# Patient Record
Sex: FEMALE | Race: WHITE | Employment: OTHER | URBAN - METROPOLITAN AREA
[De-identification: names, ages, dates, MRNs, and addresses within clinical notes are randomized per-mention and may not be internally consistent; named-entity substitution may affect disease eponyms.]

---

## 2017-01-01 ENCOUNTER — HOSPITAL ENCOUNTER (OUTPATIENT)
Dept: OTHER | Age: 52
Discharge: HOME | End: 2017-01-04
Attending: INTERNAL MEDICINE | Admitting: INTERNAL MEDICINE

## 2017-01-09 ENCOUNTER — TELEPHONE (OUTPATIENT)
Dept: BARIATRICS/WEIGHT MGMT | Age: 52
End: 2017-01-09

## 2017-01-19 ENCOUNTER — TELEPHONE (OUTPATIENT)
Dept: BARIATRICS/WEIGHT MGMT | Age: 52
End: 2017-01-19

## 2017-06-23 ENCOUNTER — HOSPITAL ENCOUNTER (OUTPATIENT)
Dept: GENERAL RADIOLOGY | Age: 52
Discharge: HOME | End: 2017-06-23
Attending: INTERNAL MEDICINE | Admitting: INTERNAL MEDICINE

## 2017-06-28 ENCOUNTER — HOSPITAL ENCOUNTER (OUTPATIENT)
Dept: GENERAL RADIOLOGY | Age: 52
Discharge: HOME | End: 2017-06-28
Attending: INTERNAL MEDICINE | Admitting: INTERNAL MEDICINE

## 2017-06-28 LAB
ALBUMIN SERPL-MCNC: 3.7 GM/DL (ref 3.4–5)
ALP BLD-CCNC: 76 IU/L (ref 40–128)
ALT SERPL-CCNC: 15 U/L (ref 10–40)
ANION GAP SERPL CALCULATED.3IONS-SCNC: 14 MMOL/L (ref 4–16)
AST SERPL-CCNC: 16 IU/L (ref 15–37)
BASOPHILS ABSOLUTE: 0.1 K/CU MM
BASOPHILS RELATIVE PERCENT: 0.5 % (ref 0–1)
BILIRUB SERPL-MCNC: 0.2 MG/DL (ref 0–1)
BUN BLDV-MCNC: 15 MG/DL (ref 6–23)
CALCIUM SERPL-MCNC: 9.4 MG/DL (ref 8.3–10.6)
CHLORIDE BLD-SCNC: 100 MMOL/L (ref 99–110)
CO2: 27 MMOL/L (ref 21–32)
CREAT SERPL-MCNC: 1 MG/DL (ref 0.6–1.1)
DIFFERENTIAL TYPE: ABNORMAL
EOSINOPHILS ABSOLUTE: 0.2 K/CU MM
EOSINOPHILS RELATIVE PERCENT: 1.6 % (ref 0–3)
ESTIMATED AVERAGE GLUCOSE: 131 MG/DL
GFR AFRICAN AMERICAN: >60 ML/MIN/1.73M2
GFR NON-AFRICAN AMERICAN: 59 ML/MIN/1.73M2
GLUCOSE BLD-MCNC: 126 MG/DL (ref 70–140)
HBA1C MFR BLD: 6.2 % (ref 4.2–6.3)
HCT VFR BLD CALC: 41 % (ref 37–47)
HEMOGLOBIN: 13 GM/DL (ref 12.5–16)
IMMATURE NEUTROPHIL %: 0.3 % (ref 0–0.43)
LYMPHOCYTES ABSOLUTE: 1.9 K/CU MM
LYMPHOCYTES RELATIVE PERCENT: 21.1 % (ref 24–44)
MAGNESIUM: 2.4 MG/DL (ref 1.8–2.4)
MCH RBC QN AUTO: 28.8 PG (ref 27–31)
MCHC RBC AUTO-ENTMCNC: 31.7 % (ref 32–36)
MCV RBC AUTO: 90.7 FL (ref 78–100)
MONOCYTES ABSOLUTE: 0.4 K/CU MM
MONOCYTES RELATIVE PERCENT: 4.5 % (ref 0–4)
NUCLEATED RBC %: 0 %
PDW BLD-RTO: 12.8 % (ref 11.7–14.9)
PLATELET # BLD: 226 K/CU MM (ref 140–440)
PMV BLD AUTO: 12.2 FL (ref 7.5–11.1)
POTASSIUM SERPL-SCNC: 5.2 MMOL/L (ref 3.5–5.1)
RBC # BLD: 4.52 M/CU MM (ref 4.2–5.4)
SEGMENTED NEUTROPHILS ABSOLUTE COUNT: 6.6 K/CU MM
SEGMENTED NEUTROPHILS RELATIVE PERCENT: 72 % (ref 36–66)
SODIUM BLD-SCNC: 141 MMOL/L (ref 135–145)
TOTAL IMMATURE NEUTOROPHIL: 0.03 K/CU MM
TOTAL NUCLEATED RBC: 0 K/CU MM
TOTAL PROTEIN: 7.2 GM/DL (ref 6.4–8.2)
WBC # BLD: 9.2 K/CU MM (ref 4–10.5)

## 2017-12-28 ENCOUNTER — HOSPITAL ENCOUNTER (OUTPATIENT)
Dept: SPEECH THERAPY | Age: 52
Discharge: HOME | End: 2017-12-28
Attending: INTERNAL MEDICINE | Admitting: INTERNAL MEDICINE

## 2017-12-28 DIAGNOSIS — F72 SEVERE MENTAL RETARDATION: Primary | ICD-10-CM

## 2017-12-28 NOTE — PROGRESS NOTES
Speech Language Pathology    [facility]  Department of [facility] Pathology   [address], [facility]  [phone] ext [phone]  Fax: [phone]    Speech and Language Evaluation   Special populations    Name: Svitlana Melvin      : 1965  MRN: [id number]  Referring Physician: Dr J Carlos Zayas  Date of Evaluation: 2017  Referring Diagnosis and ICD 10: severe mental retardation F72  Case History:   Past Medical History:   Diagnosis Date    Anemia     Cataracts, bilateral     Cerebral palsy ([address]     Cerebral palsy ([address]     Constipation, chronic     CP (cerebral palsy) ([address]     Diabetes mellitus ([address]     Fibrocystic breast disease     GERD (gastroesophageal reflux disease)     pt is on pureed diet and thin liquids    Hemiplegia ([address]     Hemiplegia affecting left nondominant side ([address]     Hiatal hernia     History of blood transfusion     Hyperlipidemia     Liver dysfunction     Mental retardation, severe (I.Q. 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 Jackson Wolf communicated via  []movement toward/away   []gestures   [x]  facial expressions  [x]eye contact  [] vocalizations   [x]verbalizations    Jackson Wolf was not seen for previous speech and language treatment. Communication partners :staff and other residents  Communication environments:community, workshop and her residence  Residents hobbies and likes/dislikes:bingo, dancing, bowling and movies    ASSESSMENT  AUDIOLOGICAL  STATUS  Informal assessment revealed consistent responses to speech at:  [x]conversational volumes        ORAL MOTOR FUNCTION  Oral motor function was judged to be reduced overall. Labial: ROM:  [] intact  [x] reduced    Coordination: [] intact  [x] reduced       Strength: [] intact  [x] reduced             Lingual: ROM: [] intact  [x] reduced       Coordination: [] intact  [x] reduced        Strength:  [] intact  [x] reduced       Facial:  ROM   [] intact  [x] reduced        SPEECH INTELLIGIBILITY  Within connected speech, speech intelligibility was judged to be  [] good  [x] fair  [] poor for getting daily needs met. Comments: When speech is difficult to understand, staff members need to question her or anticipate her needs. RECEPTIVE LANGUAGE  Auditory  Responds to name via [x] head rotation  [x]eye contact   []waving  [x]facial  expression    [] vocalization  [x]verbalizations. [x] Anticipates familiar events     [x] Responds to simple yes/no questions regarding self and environment  [x] Responds  to WH-Questions   [x] Follows two step commands/directions (with minimal to moderate cues)  [x] Identifies objects: via [x] label   []function   in a field of 6  [x] Identifies pictures: via [x]label   []function   in a field of 6    Reading: Jackson Wolf does not read.     Comments:unable to match picture/object to word    EXPRESSIVE LANGUAGE  Communication was characterized by use

## 2018-12-31 ENCOUNTER — HOSPITAL ENCOUNTER (OUTPATIENT)
Dept: SPEECH THERAPY | Age: 53
Setting detail: THERAPIES SERIES
Discharge: HOME | End: 2018-12-31
Payer: MEDICARE

## 2018-12-31 DIAGNOSIS — F72 SEVERE MENTAL RETARDATION: Primary | ICD-10-CM

## 2018-12-31 PROCEDURE — G9162 LANG EXPRESS CURRENT STATUS: HCPCS

## 2018-12-31 PROCEDURE — 92523 SPEECH SOUND LANG COMPREHEN: CPT

## 2018-12-31 PROCEDURE — G9163 LANG EXPRESS GOAL STATUS: HCPCS

## 2018-12-31 PROCEDURE — G9164 LANG EXPRESS D/C STATUS: HCPCS

## 2019-11-26 ENCOUNTER — APPOINTMENT (OUTPATIENT)
Dept: GENERAL RADIOLOGY | Age: 54
End: 2019-11-26
Payer: MEDICARE

## 2019-11-26 ENCOUNTER — HOSPITAL ENCOUNTER (EMERGENCY)
Age: 54
Discharge: HOME | End: 2019-11-27
Attending: EMERGENCY MEDICINE
Payer: MEDICARE

## 2019-11-26 VITALS
SYSTOLIC BLOOD PRESSURE: 105 MMHG | TEMPERATURE: 98.3 F | WEIGHT: 182 LBS | RESPIRATION RATE: 18 BRPM | HEIGHT: 58 IN | HEART RATE: 63 BPM | OXYGEN SATURATION: 94 % | DIASTOLIC BLOOD PRESSURE: 50 MMHG | BODY MASS INDEX: 38.2 KG/M2

## 2019-11-26 DIAGNOSIS — R13.10 DYSPHAGIA, UNSPECIFIED TYPE: Primary | ICD-10-CM

## 2019-11-26 PROCEDURE — 70360 X-RAY EXAM OF NECK: CPT

## 2019-11-26 PROCEDURE — 99284 EMERGENCY DEPT VISIT MOD MDM: CPT

## 2019-11-26 PROCEDURE — 71046 X-RAY EXAM CHEST 2 VIEWS: CPT

## 2019-11-26 ASSESSMENT — ENCOUNTER SYMPTOMS
CHOKING: 1
SORE THROAT: 0
DIARRHEA: 0
ABDOMINAL PAIN: 0
VOMITING: 0
RHINORRHEA: 0
SINUS PRESSURE: 0
SHORTNESS OF BREATH: 0
CONSTIPATION: 0
COUGH: 0
WHEEZING: 0
NAUSEA: 0

## 2020-01-02 ENCOUNTER — HOSPITAL ENCOUNTER (OUTPATIENT)
Dept: SPEECH THERAPY | Age: 55
Setting detail: THERAPIES SERIES
Discharge: HOME | End: 2020-01-02
Payer: MEDICARE

## 2020-01-02 PROCEDURE — 92523 SPEECH SOUND LANG COMPREHEN: CPT

## 2020-01-02 NOTE — PROGRESS NOTES
and was accompanied by one of her caregivers  Noman Noble communicated via  [x]movement toward/away   []gestures   [x]  facial expressions  [x]eye contact  [] vocalizations   [x]verbalizations    Noman Noble was not seen for previous speech and language treatment. Communication environments:residence, workshop, community  Residents hobbies and likes/dislikes: watching game shows, going out to eat, playing bingo    ASSESSMENT  AUDIOLOGICAL  STATUS  Informal assessment revealed consistent responses to speech at:  [x]conversational volumes      ORAL MOTOR FUNCTION  Oral motor function was judged to be fair overall. Nica Piper is edentulous. Labial: ROM:  [] intact  [x] reduced       Coordination: [] intact  [x] reduced       Strength: [] intact  [x] reduced            Lingual: ROM: [] intact  [x] reduced       Coordination: [] intact  [x] reduced      Strength:  [] intact  [x] reduced     Facial:  ROM   [] intact  [x] reduced        SPEECH INTELLIGIBILITY  Within connected speech, speech intelligibility was judged to be fair overall  Comments: At times speech intelligibility was poor and repetition was requested by this therapist. Lasha Primes second attempts were usually understood by this therapist, although they contained errors and were reduced in intelligibility at times. RECEPTIVE LANGUAGE  Auditory  Responds to name via [x] head rotation  [x]eye contact   []waving  [x]facial  expression    [] vocalization  [x]verbalizations. [x] Anticipates familiar events     [x] Responds to simple yes/no questions regarding self and environment  [x] Responds  to WH-Questions- sometimes a delay before responding  [x] Follows simple one step commands/directions (with minimal cues). Two step commands were followed inconsistently  [x] Identifies objects: via [x] label   [x]function   in a field of 4  [] Identifies pictures: via [x]label   [x]function   in a field of 6    Reading: Noman Noble does not read.       EXPRESSIVE

## 2020-01-17 ENCOUNTER — HOSPITAL ENCOUNTER (OUTPATIENT)
Dept: SPEECH THERAPY | Age: 55
Setting detail: THERAPIES SERIES
Discharge: HOME | End: 2020-01-17
Payer: MEDICARE

## 2020-01-17 PROCEDURE — 92610 EVALUATE SWALLOWING FUNCTION: CPT

## 2020-01-17 NOTE — PROGRESS NOTES
elevation. Intermittent throat clear was observed, likely d/t reduced pharyngeal clearance. Clear vocal quality and 0 overt s/s of aspiration were observed with all PO trials given. Recommend continue puree diet/thin liquids by small cup/straw sips with strict aspiration precautions. Pt requires direct supervision with meals d/t impulsivity. Small bites and sips with meals. Recommend possible modified barium swallow study if pt demonstrates continued choking episodes or pulmonary status worsens. Date of Eval: 1/17/2020  Evaluating Therapist: Hayley Blanchard    Current Diet level:  Current Diet : Dysphagia Pureed (Dysphagia I)  Current Liquid Diet : Thin      Primary Complaint  Patient Complaint: n/a    Pain:       Reason for Referral  Mary Canseco was referred for a bedside swallow evaluation to assess the efficiency of her swallow function, identify signs and symptoms of aspiration and make recommendations regarding safe dietary consistencies, effective compensatory strategies, and safe eating environment. Impression  Dysphagia Diagnosis: Mild to moderate oral stage dysphagia;Mild to moderate pharyngeal stage dysphagia  Dysphagia Outcome Severity Scale: Level 4: Mild moderate dysphagia- Intermittent supervision/cueing. One - two diet consistencies restricted     Treatment Plan  Requires SLP Intervention: No  Duration/Frequency of Treatment: n/a  D/C Recommendations: 24 hour supervision/assistance       Recommended Diet and Intervention  Diet Solids Recommendation: Dysphagia Pureed (Dysphagia I)  Liquid Consistency Recommendation: Thin  Recommended Form of Meds: Meds in puree  Recommendations: Modified barium swallow study       Compensatory Swallowing Strategies  Compensatory Swallowing Strategies: Remain upright for 30-45 minutes after meals;Upright as possible for all oral intake;Eat/Feed slowly; Small bites/sips    Treatment/Goals       General  Chart Reviewed: Yes  Behavior/Cognition: Alert; Cooperative;Pleasant mood  Respiratory Status: Room air  O2 Device: None (Room air)  Communication Observation: Functional  Follows Directions: Simple  Dentition: Edentulous  Patient Positioning: Upright in chair  Baseline Vocal Quality: Normal  Volitional Cough: Strong  Volitional Swallow: Delayed  Prior Dysphagia History: choking on pureed trials   Consistencies Administered: Dysphagia Pureed (Dysphagia I); Thin - straw; Thin - cup; Thin - teaspoon           Vision/Hearing  Vision  Vision: Within Functional Limits  Hearing  Hearing: Within functional limits    Oral Motor Deficits  Oral/Motor  Oral Motor: Exceptions to [facility]  Labial Coordination: Reduced  Lingual Coordination: Reduced    Oral Phase Dysfunction  Oral Phase  Oral Phase: Exceptions  Oral Phase Dysfunction  Impaired Mastication: All  Decreased Anterior to Posterior Transit: All  Lingual/Palatal Residue: All     Indicators of Pharyngeal Phase Dysfunction   Pharyngeal Phase  Pharyngeal Phase: Exceptions  Indicators of Pharyngeal Phase Dysfunction  Delayed Swallow: All  Decreased Laryngeal Elevation: All  Throat Clearing - Immediate: Puree - teaspoon    Prognosis  Prognosis  Prognosis for safe diet advancement: fair  Barriers to reach goals: cognitive deficits;severity of dysphagia  Individuals consulted  Consulted and agree with results and recommendations: Patient; Other (add comment)    Education  Patient Education: recommendations/POC  Patient Education Response: Verbalizes understanding  Safety Devices in place: Yes  Type of devices:  All fall risk precautions in place       Therapy Time  SLP Individual Minutes  Time In: 0900  Time Out: 0930  Minutes: Yvrose Elkins 88, CCC-SLP, 1/17/2020

## 2020-03-09 ENCOUNTER — ANESTHESIA EVENT (OUTPATIENT)
Dept: ENDOSCOPY | Age: 55
End: 2020-03-09

## 2020-03-11 NOTE — ANESTHESIA PRE PROCEDURE
MD   nystatin (MYCOSTATIN) 620260 UNIT/GM powder Apply topically 4 times daily as needed    Yes Historical Provider, MD   omeprazole (PRILOSEC) 20 MG capsule Take 20 mg by mouth daily. Yes Historical Provider, MD   citalopram (CELEXA) 20 MG tablet Take 20 mg by mouth daily. Yes Historical Provider, MD   furosemide (LASIX) 20 MG tablet Take 20 mg by mouth daily. Yes Historical Provider, MD   alendronate (FOSAMAX) 70 MG tablet Take 70 mg by mouth every 7 days. Yes Historical Provider, MD   levetiracetam (KEPPRA) 500 MG tablet Take 750 mg by mouth 2 times daily. Yes Historical Provider, MD   calcium-vitamin D (OSCAL-500) 500-200 MG-UNIT per tablet Take 1 tablet by mouth daily    Yes Historical Provider, MD   lacosamide (VIMPAT) 50 MG TABS tablet Take 50 mg by mouth 2 times daily    Yes Historical Provider, MD   acetaminophen (TYLENOL) 325 MG tablet Take 650 mg by mouth every 4 hours as needed. Yes Historical Provider, MD   clonidine (CATAPRES) 0.1 MG tablet Take 0.1 mg by mouth 2 times daily. Yes Historical Provider, MD   docusate sodium (COLACE) 100 MG capsule Take 200 mg by mouth every evening. Yes Historical Provider, MD   ferrous sulfate 325 (65 FE) MG tablet Take 325 mg by mouth daily (with breakfast)    Yes Historical Provider, MD       Current medications:    No current facility-administered medications for this encounter.       Current Outpatient Medications   Medication Sig Dispense Refill    insulin glargine (BASAGLAR KWIKPEN) 100 UNIT/ML injection pen Inject 17 Units into the skin 2 times daily      B Complex-C-E-Zn (ADVANCED STRESS FORMULA/ZINC) TABS Take 1 tablet by mouth nightly      Icosapent Ethyl (VASCEPA) 1 g CAPS capsule Take 2 capsules by mouth 4 times daily      Cholecalciferol (VITAMIN D3) 50 MCG (2000 UT) CAPS Take 1,000 Units by mouth daily      Zinc Oxide (DESITIN CREAMY EX) Apply topically daily as needed      atorvastatin (LIPITOR) 10 MG tablet Take 10 mg by mouth [address]    Anesthesia Evaluation    Airway:         Dental:          Pulmonary:                              Cardiovascular:    (+) hypertension:, hyperlipidemia         Beta Blocker:  Not on Beta Blocker      ROS comment: Echo 1/2011:  IMPRESSIONS:    1.  Mild left ventricular hypertrophy noted. 2.  Left ventricular function preserved. Ejection fraction is around 50%  range. 3.  Mild left atrial enlargement present. 4.  Mild mitral and tricuspid regurgitation noted. 5.  Grade 2 diastolic dysfunction is noted. Neuro/Psych:   (+) seizures:, psychiatric history:             ROS comment: Mental retardation, severe (I.Q. 20-34) pt is verbal, non ambulatory, incontinent- pt feeds self but needs help with all other ADL's     Paraplegia    cerebral palsy GI/Hepatic/Renal:   (+) hiatal hernia, GERD:,           Endo/Other:    (+) DiabetesType II DM, , blood dyscrasia: thrombocytopenia:., .                 Abdominal:           Vascular: negative vascular ROS. Anesthesia Plan      MAC     ASA 4       Induction: intravenous. NADEGE Newton - CRNA   3/11/2020         Pre Anesthesia Assessment complete.  Chart reviewed on 3/11/2020

## 2020-03-12 ENCOUNTER — ANESTHESIA (OUTPATIENT)
Dept: ENDOSCOPY | Age: 55
End: 2020-03-12

## 2020-08-07 ENCOUNTER — HOSPITAL ENCOUNTER (OUTPATIENT)
Dept: LAB | Age: 55
Discharge: HOME | End: 2020-08-07
Payer: MEDICARE

## 2020-08-07 PROCEDURE — U0002 COVID-19 LAB TEST NON-CDC: HCPCS

## 2020-08-09 LAB
SARS-COV-2: NOT DETECTED
SOURCE: NORMAL

## 2020-08-12 ENCOUNTER — ANESTHESIA EVENT (OUTPATIENT)
Dept: ENDOSCOPY | Age: 55
End: 2020-08-12

## 2020-08-13 ENCOUNTER — ANESTHESIA (OUTPATIENT)
Dept: ENDOSCOPY | Age: 55
End: 2020-08-13

## 2020-08-13 ENCOUNTER — HOSPITAL ENCOUNTER (OUTPATIENT)
Age: 55
Setting detail: OUTPATIENT SURGERY
Discharge: HOME | End: 2020-08-13
Attending: INTERNAL MEDICINE | Admitting: INTERNAL MEDICINE
Payer: MEDICARE

## 2020-08-13 VITALS
DIASTOLIC BLOOD PRESSURE: 69 MMHG | OXYGEN SATURATION: 95 % | SYSTOLIC BLOOD PRESSURE: 149 MMHG | HEIGHT: 58 IN | RESPIRATION RATE: 16 BRPM | HEART RATE: 55 BPM | BODY MASS INDEX: 39.04 KG/M2 | WEIGHT: 186 LBS | TEMPERATURE: 96.5 F

## 2020-08-13 LAB — GLUCOSE BLD-MCNC: 142 MG/DL (ref 70–99)

## 2020-08-13 PROCEDURE — 82962 GLUCOSE BLOOD TEST: CPT

## 2020-08-13 RX ORDER — SODIUM CHLORIDE, SODIUM LACTATE, POTASSIUM CHLORIDE, CALCIUM CHLORIDE 600; 310; 30; 20 MG/100ML; MG/100ML; MG/100ML; MG/100ML
INJECTION, SOLUTION INTRAVENOUS CONTINUOUS
Status: DISCONTINUED | OUTPATIENT
Start: 2020-08-13 | End: 2020-08-13 | Stop reason: HOSPADM

## 2020-08-13 ASSESSMENT — PAIN - FUNCTIONAL ASSESSMENT: PAIN_FUNCTIONAL_ASSESSMENT: FACES

## 2020-08-13 NOTE — H&P
[facility] Pre-operative History and Physical    Patient: Chente Li  : 1965  Acct#:       HISTORY OF PRESENT ILLNESS:    The patient is a 49 y.o. female with significant past medical history as below who presents for EGD     Indication Dysphagia    History Obtained From:  Patient and review of all records    Past Medical History:        Diagnosis Date    Acute gastritis without bleeding     Anemia     Anemia     Cardiomegaly     Cataracts, bilateral     Cerebral palsy ([address]     Cerebral palsy ([address]     Cerebral palsy ([address]     Congenital hiatus hernia     Constipation, chronic     CP (cerebral palsy) ([address]     Cyst of pancreas     Diabetes mellitus ([address]     Essential (primary) hypertension     Fibrocystic breast disease     GERD (gastroesophageal reflux disease)     pt is on pureed diet and thin liquids    GERD (gastroesophageal reflux disease)     Hemiplegia (HCC)     Hemiplegia affecting left nondominant side ([address]     Hiatal hernia     History of blood transfusion     Hyperlipidemia     Hypocalcemia     Liver dysfunction     Major depressive disorder, single episode, unspecified     Mental retardation, severe (I.Q. 20-34)     pt is verbal, non ambulatory, incontinent- pt feeds self but needs help with all other ADL's    OA (osteoarthritis)     Other constipation     Other epilepsy, not intractable, without status epilepticus ([address]     Pancreatitis     Paraplegia ([address]     \"from waist down\"    Paraplegia ([address]     Paraplegia, unspecified ([address]     Pneumonia 2014    Pressure ulcer     sacral area    Seizure disorder ([address]     Seizures ([address]     per caregiver- no seizure since at [facility] 10/9/2015    Severe intellectual disabilities     Unspecified cataract        Past Surgical History:        Procedure Laterality Date    CHOLECYSTECTOMY      per old chart hx of Nguyễn choley by Dr Adebayo Plunkett  2017    ENDOSCOPY, COLON, DIAGNOSTIC      per old chart EGD 8/2013    EXTERNAL EAR SURGERY  8/2007    debridement of external auditory canals    HYSTERECTOMY      OTHER SURGICAL HISTORY  11 04 2015    closure of recurrent pilondial cyst         Current Medications:    Medications    Prior to Admission medications    Medication Sig Start Date End Date Taking? Authorizing Provider   insulin glargine (BASAGLAR KWIKPEN) 100 UNIT/ML injection pen Inject 17 Units into the skin 2 times daily    Historical Provider, MD GUSTAFSON Complex-C-E-Zn (ADVANCED STRESS FORMULA/ZINC) TABS Take 1 tablet by mouth nightly    Historical Provider, MD   Icosapent Ethyl (VASCEPA) 1 g CAPS capsule Take 2 capsules by mouth 4 times daily    Historical Provider, MD   Cholecalciferol (VITAMIN D3) 50 MCG (2000 UT) CAPS Take 1,000 Units by mouth daily    Historical Provider, MD   Zinc Oxide (DESITIN CREAMY EX) Apply topically daily as needed    Historical Provider, MD   atorvastatin (LIPITOR) 10 MG tablet Take 10 mg by mouth daily    Historical Provider, MD   Sodium Phosphates (FLEET) 7-19 GM/118ML Place 1 enema rectally once as needed    Historical Provider, MD   insulin aspart (NOVOLOG) 100 UNIT/ML injection vial Inject 16 Units into the skin 4 times daily Use sliding scale    Historical Provider, MD   magnesium hydroxide (MILK OF MAGNESIA) 400 MG/5ML suspension Take 30 mLs by mouth daily as needed for Constipation 10/8/15   Milli Daniel MD   magnesium oxide (MAG-OX) 400 (241.3 MG) MG TABS tablet Take 1 tablet by mouth 2 times daily. 11/21/14   Milli Daniel MD   potassium chloride SA (K-DUR;KLOR-CON M) 20 MEQ tablet Take 1 tablet by mouth daily (with breakfast). 11/21/14   Milli Daniel MD   bisacodyl (DULCOLAX) 10 MG suppository Place 10 mg rectally daily.     Historical Provider, MD   folic acid (FOLVITE) 742 MCG tablet Take 800 mcg by mouth daily     Historical Provider, MD   nystatin (MYCOSTATIN) 965904 UNIT/GM powder Apply topically 4 times daily as needed Historical Provider, MD   omeprazole (PRILOSEC) 20 MG capsule Take 20 mg by mouth daily. Historical Provider, MD   citalopram (CELEXA) 20 MG tablet Take 20 mg by mouth daily. Historical Provider, MD   furosemide (LASIX) 20 MG tablet Take 20 mg by mouth daily. Historical Provider, MD   alendronate (FOSAMAX) 70 MG tablet Take 70 mg by mouth every 7 days. Historical Provider, MD   levetiracetam (KEPPRA) 500 MG tablet Take 750 mg by mouth 2 times daily. Historical Provider, MD   calcium-vitamin D (OSCAL-500) 500-200 MG-UNIT per tablet Take 1 tablet by mouth daily     Historical Provider, MD   lacosamide (VIMPAT) 50 MG TABS tablet Take 50 mg by mouth 2 times daily     Historical Provider, MD   acetaminophen (TYLENOL) 325 MG tablet Take 650 mg by mouth every 4 hours as needed. Historical Provider, MD   clonidine (CATAPRES) 0.1 MG tablet Take 0.1 mg by mouth 2 times daily. Historical Provider, MD   docusate sodium (COLACE) 100 MG capsule Take 200 mg by mouth every evening. Historical Provider, MD   ferrous sulfate 325 (65 FE) MG tablet Take 325 mg by mouth daily (with breakfast)     Historical Provider, MD      Scheduled Medications:   Infusions:   PRN Medications: Allergies: Allergies   Allergen Reactions    Risperidone And Related Other (See Comments)     unknown    Tuberculin Tests Other (See Comments)         Allergies:  Risperidone and related and Tuberculin tests    Social History:   TOBACCO:   reports that she has never smoked. She has never used smokeless tobacco.  ETOH:   reports no history of alcohol use.     Family History:       Family history unknown: Yes       REVIEW OF SYSTEMS:    Negative except for HPI        PHYSICAL EXAM:      Vitals:    Ht 4' 10\" (1.473 m)   Wt 186 lb (84.4 kg)   BMI 38.87 kg/m²     General Appearance:    Alert, cooperative, no distress, appears stated age   [de-identified]:    NO anemia, No jaundice, No cyanosis   Neck:   Supple, symmetrical, trachea midline, no adenopathy;     thyroid:    Lungs:     Clear to auscultation bilaterally, respirations unlabored   Chest Wall:    No tenderness or deformity    Heart:    Regular rate and rhythm, S1 and S2 normal, no murmur, rub   or gallop   Abdomen:     Soft, non-tender, bowel sounds active all four quadrants,     no masses, no organomegaly, no ascitis   Rectal:    Not indicated   Extremities:   Extremities normal, atraumatic, no cyanosis or edema   Pulses:   2+ and symmetric all extremities   Skin:   Skin color, texture, turgor normal, no rashes or lesions   Lymph nodes:   No abnormality   Neurologic:   No focal deficits, moving all four extremities      ASA Grade:  ASA 3 - Patient with moderate systemic disease with functional limitations  Air way:    Prior Anesthesia complications: Nill      ASSESSMENT AND PLAN:    1. Patient is a 49 y.o. female here for EGD with deep sedation  2. Procedure options, risks and benefits reviewed with guardian. Guardian expresses understanding.       Sarah Martinez MD  [facility] gastroenterology  8/13/2020  8:06 AM

## 2021-01-25 ENCOUNTER — HOSPITAL ENCOUNTER (OUTPATIENT)
Dept: SPEECH THERAPY | Age: 56
Setting detail: THERAPIES SERIES
Discharge: HOME | End: 2021-01-25
Payer: MEDICARE

## 2021-01-25 DIAGNOSIS — F72 SEVERE INTELLECTUAL DISABILITIES: Primary | ICD-10-CM

## 2021-01-25 PROCEDURE — 92523 SPEECH SOUND LANG COMPREHEN: CPT

## 2021-01-25 NOTE — PROGRESS NOTES
Speech Language Pathology    [facility]  Department of [facility] Pathology   [facility], [address] [place] [address]  [phone] ext [phone]  Fax: [phone]    Speech and Language Evaluation   Special populations    Name: Song Beaver      : 1965  MRN: [id number]  Referring Physician: Dr Alton Jama Jr/   Date of Evaluation: 2021  Referring Diagnosis and ICD 10: severe intellectual disabilities F72  Case History:   Past Medical History:   Diagnosis Date    Acute gastritis without bleeding     Anemia     Anemia     Cardiomegaly     Cataracts, bilateral     Cerebral palsy ([address]     Cerebral palsy ([address]     Cerebral palsy ([address]     Congenital hiatus hernia     Constipation, chronic     CP (cerebral palsy) ([address]     Cyst of pancreas     Diabetes mellitus ([address]     Essential (primary) hypertension     Fibrocystic breast disease     GERD (gastroesophageal reflux disease)     pt is on pureed diet and thin liquids    GERD (gastroesophageal reflux disease)     Hemiplegia (HCC)     Hemiplegia affecting left nondominant side ([address]     Hiatal hernia     History of blood transfusion     Hyperlipidemia     Hypocalcemia     Liver dysfunction     Major depressive disorder, single episode, unspecified     Mental retardation, severe (I.Q. 20-34)     pt is verbal, non ambulatory, incontinent- pt feeds self but needs help with all other ADL's    OA (osteoarthritis)     Other constipation     Other epilepsy, not intractable, without status epilepticus ([address]     Pancreatitis     Paraplegia ([address]     \"from waist down\"    Paraplegia ([address]     Paraplegia, unspecified ([address]     Pneumonia 2014    Pressure ulcer     sacral area    Seizure disorder ([address]     Seizures ([address]     per caregiver- no seizure [facility]/9/2015    Severe intellectual disabilities     Unspecified cataract        Treatment Dx and ICD 10: communication disorder LANGUAGE  Auditory  Responds to name via [x] head rotation  [x]eye contact   []waving  [x]facial  expression    [] vocalization  [x]verbalizations. [x] Anticipates familiar events     [x] Responds to simple yes/no questions regarding self and environment  [x] Responds  to WH-Questions   [x] Follows two  step commands/directions (with minimal  cues)  [x] Identifies pictures: via [x]label   [x]function   in a field of 6    Reading: Dori Adams reportedly does not read. EXPRESSIVE LANGUAGE  Communication was characterized by use of:  [x]verbal utterances - during answering of questions and picture description, she produced mainly one word productions that varied in intelligibility   [x]gestures   [x]facial expressions    Likes/dislikes were expressed via one word responses      WRITTEN EXPRESSION  Non functional    [address] exhibited the following pragmatic skills to communicate the following requests:  Objects:[x] grasping   [x]verbalization   [x]eye contact   []leads staff by hand    [x]gesture/signs []facial expressions   []vocalizing    Assistance:   [x] grasping   [x]verbalization   [x]eye contact   []leads staff by hand    [x]gesture/signs []facial expressions   []vocalizing     Attention: [x] grasping   [x]verbalization   [x]eye contact   []leads staff by hand    [x]gesture/signs []facial expressions   []vocalizing    Cessation:[x] grasping   [x]verbalization   [x]eye contact   []leads staff by hand    [x]gesture/signs []facial expressions   []vocalizing    Affection: [x] grasping   [x]verbalization   [x]eye contact   []leads staff by hand    [x]gesture/signs []facial expressions   []vocalizing        IMPRESSIONS:  Dori Adams communication skills were judged to be functional  for the present communication environments in which communication regularly occurs. When her speech intelligibility is poor, staff will need to anticipate her needs.       Recommendations: no treatment is recommended at this time    Prognosis for effective communication within present communication environments is fair to good with assistance from staff. Pain rating 0/10:  Education:results of assessment were discussed with caregiver who verbalized understanding  Time In:1:24PM  Time Out:1:48PM  Total Evaluation Time:24 minutes      Rob Pradhan MS, CCC-SLP  Speech/Language Pathologist  1/25/2021  2:11 PM

## 2021-04-14 ENCOUNTER — OFFICE VISIT (OUTPATIENT)
Dept: BARIATRICS/WEIGHT MGMT | Age: 56
End: 2021-04-14
Payer: MEDICARE

## 2021-04-14 VITALS
WEIGHT: 186 LBS | SYSTOLIC BLOOD PRESSURE: 130 MMHG | DIASTOLIC BLOOD PRESSURE: 72 MMHG | OXYGEN SATURATION: 97 % | HEART RATE: 68 BPM | BODY MASS INDEX: 39.04 KG/M2 | HEIGHT: 58 IN

## 2021-04-14 DIAGNOSIS — L91.0 KELOID: Primary | ICD-10-CM

## 2021-04-14 PROCEDURE — 1036F TOBACCO NON-USER: CPT | Performed by: SURGERY

## 2021-04-14 PROCEDURE — G8427 DOCREV CUR MEDS BY ELIG CLIN: HCPCS | Performed by: SURGERY

## 2021-04-14 PROCEDURE — 3017F COLORECTAL CA SCREEN DOC REV: CPT | Performed by: SURGERY

## 2021-04-14 PROCEDURE — 99213 OFFICE O/P EST LOW 20 MIN: CPT | Performed by: SURGERY

## 2021-04-14 PROCEDURE — G8417 CALC BMI ABV UP PARAM F/U: HCPCS | Performed by: SURGERY

## 2021-04-14 ASSESSMENT — ENCOUNTER SYMPTOMS
PHOTOPHOBIA: 0
BLOOD IN STOOL: 0
DIARRHEA: 0
ANAL BLEEDING: 0
TROUBLE SWALLOWING: 0
ABDOMINAL PAIN: 0
VOMITING: 0
WHEEZING: 0
COUGH: 0
NAUSEA: 0
VOICE CHANGE: 0
SHORTNESS OF BREATH: 0
SORE THROAT: 0
CONSTIPATION: 0
COLOR CHANGE: 1
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
## 2021-04-26 NOTE — PROGRESS NOTES
4/26/21 - . LM concerning  surgery on 4/29/21 - have your covid-19 test performed within 10  days of your procedure, then quarantine yourself until after your procedure. Please call the PAT Nurse.

## 2021-04-26 NOTE — PROGRESS NOTES
4/26/21 - SUSAN with  @ [facility], have Nurse call PAT. Will need medication list, code status and most recent covid test faxed. Procedure is 2578, arrival 36. Will alert scheduling to not change time due to transportation.

## 2021-04-28 ENCOUNTER — TELEPHONE (OUTPATIENT)
Dept: BARIATRICS/WEIGHT MGMT | Age: 56
End: 2021-04-28

## 2021-04-28 NOTE — TELEPHONE ENCOUNTER
LEFT MESSAGE FOR David Clarke REGARDING SURGERY (EXC. OF 2 UPPERBACK AND 1 UPPER CHEST KELOIDS) SCHEDULED @ [facility].  NOTIFIED OF DATES, TIMES AND LOCATION    PHONE ASSESSMENT   COVID - DONE AT FACILITY, WILL BRING RESULTS  SURGERY - 5/20/21 @ 460  P/O - 5/28/21 @ 2594    NPO AFTER MIDNIGHT  HOLD BLOOD THINNERS - 5 DAYS PRIOR IF TAKING ANY    PER ALVERNE - REQUESTED ALL INFO BE LEFT ON VM

## 2021-05-17 NOTE — PROGRESS NOTES
5/17/21 -  at [address] [place] [address] for Nurse to call PAT - re: Nancy Landa scheduled 5/20/21 for surgery.

## 2021-05-18 NOTE — PROGRESS NOTES
Instructions reviewed with Siena Sánchez, GARIMA @ [facility]    Surgery 5/20/21 @ [phone], arrival 0600                1. Do not eat or drink anything after midnight - unless instructed by your doctor prior to surgery. This includes                   no water, chewing gum or mints. 2. Follow your directions as prescribed by the doctor for your procedure and medications. Give Clonidine, Keppra and Prilosec with a sip in am.   3. Check with your Doctor regarding stopping Plavix, Coumadin, Lovenox,Effient,Pradaxa,Xarelto, Fragmin or other blood thinners and                   follow their instructions. 4. Do not smoke, and do not drink any alcoholic beverages 24 hours prior to surgery. This includes NA Beer. 5. You may brush your teeth and gargle the morning of surgery. DO NOT SWALLOW WATER   6. You MUST make arrangements for a responsible adult to take you home after your surgery and be able to check on you every couple                   hours for the day. You will not be allowed to leave alone or drive yourself home. It is strongly suggested someone stay with you the first 24                   hrs. Your surgery will be cancelled if you do not have a ride home. 7. Please wear simple, loose fitting clothing to the hospital.  Mike Bergsaloni not bring valuables (money, credit cards, checkbooks, etc.) Do not wear any                   makeup (including no eye makeup) or nail polish on your fingers or toes. 8. DO NOT wear any jewelry or piercings on day of surgery. All body piercing jewelry must be removed. 9. If you have dentures, they will be removed before going to the OR; we will provide you a container. If you wear contact lenses or glasses,                  they will be removed; please bring a case for them. 10. If you  have a Living Will and Durable Power of  for Healthcare, please bring in a copy.            11. Please bring picture ID,  insurance card, paperwork from the doctors office    (H & P, Consent, & card for implantable devices). 12. Take a shower the night before or morning of your procedure, do not apply any lotion, oil or powder. 13. Wear a mask covering your nose & mouth when entering the hospital. Have your covid-19 test performed within 10 days of your                  surgery. Quarantine yourself after the test until after your surgery.

## 2021-05-19 ENCOUNTER — ANESTHESIA EVENT (OUTPATIENT)
Dept: OPERATING ROOM | Age: 56
End: 2021-05-19
Payer: MEDICARE

## 2021-05-19 NOTE — ANESTHESIA PRE PROCEDURE
Department of Anesthesiology  Preprocedure Note       Name:  David Clarke   Age:  48 y.o.  :  1965                                          MRN:  [id number]         Date:  2021      Surgeon: Rc Khan):  Trae Clancy MD    Procedure: Procedure(s):  TWO UPPER BACK AND ONE CHEST KELOID TORSO EXCISION    Medications prior to admission:   Prior to Admission medications    Medication Sig Start Date End Date Taking? Authorizing Provider   insulin glargine (BASAGLAR KWIKPEN) 100 UNIT/ML injection pen Inject 17 Units into the skin 2 times daily    Historical Provider, MD GUSTAFSON Complex-C-E-Zn (ADVANCED STRESS FORMULA/ZINC) TABS Take 1 tablet by mouth nightly    Historical Provider, MD   Icosapent Ethyl (VASCEPA) 1 g CAPS capsule Take 2 capsules by mouth 4 times daily    Historical Provider, MD   Cholecalciferol (VITAMIN D3) 50 MCG ( UT) CAPS Take 1,000 Units by mouth daily    Historical Provider, MD   Zinc Oxide (DESITIN CREAMY EX) Apply topically daily as needed    Historical Provider, MD   atorvastatin (LIPITOR) 10 MG tablet Take 10 mg by mouth daily    Historical Provider, MD   Sodium Phosphates (FLEET) 7-19 GM/118ML Place 1 enema rectally once as needed    Historical Provider, MD   insulin aspart (NOVOLOG) 100 UNIT/ML injection vial Inject 16 Units into the skin 4 times daily Use sliding scale    Historical Provider, MD   magnesium hydroxide (MILK OF MAGNESIA) 400 MG/5ML suspension Take 30 mLs by mouth daily as needed for Constipation 10/8/15   Wanda Johnson MD   magnesium oxide (MAG-OX) 400 (241.3 MG) MG TABS tablet Take 1 tablet by mouth 2 times daily. 14   Wanda Johnson MD   potassium chloride SA (K-DUR;KLOR-CON M) 20 MEQ tablet Take 1 tablet by mouth daily (with breakfast). 14   Wanda Johnson MD   bisacodyl (DULCOLAX) 10 MG suppository Place 10 mg rectally daily.     Historical Provider, MD   folic acid (FOLVITE) 410 MCG tablet Take 800 mcg by mouth daily     Historical Provider, MD nystatin (MYCOSTATIN) 908142 UNIT/GM powder Apply topically 4 times daily as needed     Historical Provider, MD   omeprazole (PRILOSEC) 20 MG capsule Take 20 mg by mouth daily. Historical Provider, MD   citalopram (CELEXA) 20 MG tablet Take 20 mg by mouth daily. Historical Provider, MD   furosemide (LASIX) 20 MG tablet Take 20 mg by mouth daily. Historical Provider, MD   alendronate (FOSAMAX) 70 MG tablet Take 70 mg by mouth every 7 days. Historical Provider, MD   levetiracetam (KEPPRA) 500 MG tablet Take 750 mg by mouth 2 times daily. Historical Provider, MD   calcium-vitamin D (OSCAL-500) 500-200 MG-UNIT per tablet Take 1 tablet by mouth daily     Historical Provider, MD   lacosamide (VIMPAT) 50 MG TABS tablet Take 50 mg by mouth 2 times daily     Historical Provider, MD   acetaminophen (TYLENOL) 325 MG tablet Take 650 mg by mouth every 4 hours as needed. Historical Provider, MD   clonidine (CATAPRES) 0.1 MG tablet Take 0.1 mg by mouth 2 times daily. Historical Provider, MD   docusate sodium (COLACE) 100 MG capsule Take 200 mg by mouth every evening. Historical Provider, MD   ferrous sulfate 325 (65 FE) MG tablet Take 325 mg by mouth daily (with breakfast)     Historical Provider, MD       Current medications:    No current facility-administered medications for this encounter.      Current Outpatient Medications   Medication Sig Dispense Refill    insulin glargine (BASAGLAR KWIKPEN) 100 UNIT/ML injection pen Inject 17 Units into the skin 2 times daily      B Complex-C-E-Zn (ADVANCED STRESS FORMULA/ZINC) TABS Take 1 tablet by mouth nightly      Icosapent Ethyl (VASCEPA) 1 g CAPS capsule Take 2 capsules by mouth 4 times daily      Cholecalciferol (VITAMIN D3) 50 MCG (2000 UT) CAPS Take 1,000 Units by mouth daily      Zinc Oxide (DESITIN CREAMY EX) Apply topically daily as needed      atorvastatin (LIPITOR) 10 MG tablet Take 10 mg by mouth daily      Sodium Phosphates (FLEET) 7-19 GM/118ML Place 1 enema rectally once as needed      insulin aspart (NOVOLOG) 100 UNIT/ML injection vial Inject 16 Units into the skin 4 times daily Use sliding scale      magnesium hydroxide (MILK OF MAGNESIA) 400 MG/5ML suspension Take 30 mLs by mouth daily as needed for Constipation      magnesium oxide (MAG-OX) 400 (241.3 MG) MG TABS tablet Take 1 tablet by mouth 2 times daily.  potassium chloride SA (K-DUR;KLOR-CON M) 20 MEQ tablet Take 1 tablet by mouth daily (with breakfast). 60 tablet 3    bisacodyl (DULCOLAX) 10 MG suppository Place 10 mg rectally daily.  folic acid (FOLVITE) 019 MCG tablet Take 800 mcg by mouth daily       nystatin (MYCOSTATIN) 865653 UNIT/GM powder Apply topically 4 times daily as needed       omeprazole (PRILOSEC) 20 MG capsule Take 20 mg by mouth daily.  citalopram (CELEXA) 20 MG tablet Take 20 mg by mouth daily.  furosemide (LASIX) 20 MG tablet Take 20 mg by mouth daily.  alendronate (FOSAMAX) 70 MG tablet Take 70 mg by mouth every 7 days.  levetiracetam (KEPPRA) 500 MG tablet Take 750 mg by mouth 2 times daily.  calcium-vitamin D (OSCAL-500) 500-200 MG-UNIT per tablet Take 1 tablet by mouth daily       lacosamide (VIMPAT) 50 MG TABS tablet Take 50 mg by mouth 2 times daily       acetaminophen (TYLENOL) 325 MG tablet Take 650 mg by mouth every 4 hours as needed.  clonidine (CATAPRES) 0.1 MG tablet Take 0.1 mg by mouth 2 times daily.  docusate sodium (COLACE) 100 MG capsule Take 200 mg by mouth every evening.  ferrous sulfate 325 (65 FE) MG tablet Take 325 mg by mouth daily (with breakfast)          Allergies:     Allergies   Allergen Reactions    Risperidone And Related Other (See Comments)     unknown    Tuberculin Tests Other (See Comments)       Problem List:    Patient Active Problem List   Diagnosis Code    Seizure disorder ([facility] G40.909    Infantile cerebral palsy ([facility] G80.9    Severe mental retardation F72    Encephalomalacia G93.89    Hypertension I10    Pressure ulcer, sacrum L89.159    Sepsis ([address] A41.9    Type II or unspecified type diabetes mellitus without mention of complication, uncontrolled [id number]    Hypokalemia E87.6    Thrombocytopenia, secondary D69.59    Hypocalcemia E83.51    Bleeding from wound T14. 8XXA    Wound of sacral region S31.000A    Decubitus ulcer of coccygeal region, stage 4 (HCC) L89.154    Decubitus ulcer L89.90    Open wound of lower back and pelvis w/o penentrat into retroperitoneum S31.000A    Keloid L91.0       Past Medical History:        Diagnosis Date    Acute gastritis without bleeding     Anemia     Anemia     Cardiomegaly     Cataracts, bilateral     Cerebral palsy (HCC)     Cerebral palsy (HCC)     Cerebral palsy (HCC)     Congenital hiatus hernia     Constipation, chronic     CP (cerebral palsy) (HCC)     Cyst of pancreas     Diabetes mellitus ([address]     Essential (primary) hypertension     Fibrocystic breast disease     GERD (gastroesophageal reflux disease)     pt is on pureed diet and thin liquids    GERD (gastroesophageal reflux disease)     Hemiplegia (HCC)     Hemiplegia affecting left nondominant side (HCC)     Hiatal hernia     History of blood transfusion 2011    Hyperlipidemia     Hypocalcemia     Liver dysfunction     Major depressive disorder, single episode, unspecified     Mental retardation, severe (I.Q. 20-34)     pt is verbal, non ambulatory, incontinent- pt feeds self but needs help with all other ADL's    OA (osteoarthritis)     Other constipation     Other epilepsy, not intractable, without status epilepticus ([address]     Pancreatitis 2011    Paraplegia ([address]     \"from waist down\"    Paraplegia ([address]     Paraplegia, unspecified ([address]     Pneumonia 11/2014    Pressure ulcer     sacral area    Seizure disorder ([address]     Seizures ([address]     per caregiver- no seizure [facility] at [facility] 10[facility]9/2015    Severe intellectual disabilities     Unspecified cataract        Past Surgical History:        Procedure Laterality Date    CHOLECYSTECTOMY      per old chart hx of Lap choley by Dr Jacquelin Hubbard  01/20/2017    ENDOSCOPY, COLON, DIAGNOSTIC      per old chart EGD 8/2013    EXTERNAL EAR SURGERY  8/2007    debridement of external auditory canals    HYSTERECTOMY      OTHER SURGICAL HISTORY  11 04 2015    closure of recurrent pilondial cyst       Social History:    Social History     Tobacco Use    Smoking status: Never Smoker    Smokeless tobacco: Never Used    Tobacco comment: caregiver is unsure of surgical, family or social hx1/19/2017   Substance Use Topics    Alcohol use: No                                Counseling given: Not Answered  Comment: caregiver is unsure of surgical, family or social hx1/19/2017      Vital Signs (Current):   Vitals:    05/18/21 1448   Weight: 177 lb 8 oz (80.5 kg)   Height: 4' 10\" (1.473 m)                                              BP Readings from Last 3 Encounters:   04/14/21 130/72   08/13/20 (!) 149/69   03/12/20 118/73       NPO Status:                                                                                 BMI:   Wt Readings from Last 3 Encounters:   04/14/21 186 lb (84.4 kg)   08/07/20 186 lb (84.4 kg)   03/10/20 177 lb 8 oz (80.5 kg)     Body mass index is 37.1 kg/m².     CBC:   Lab Results   Component Value Date    WBC 9.2 06/28/2017    RBC 4.52 06/28/2017    HGB 13.0 06/28/2017    HCT 41.0 06/28/2017    MCV 90.7 06/28/2017    RDW 12.8 06/28/2017     06/28/2017       CMP:   Lab Results   Component Value Date     06/28/2017    K 5.2 06/28/2017     06/28/2017    CO2 27 06/28/2017    BUN 15 06/28/2017    CREATININE 1.0 06/28/2017    GFRAA >60 06/28/2017    LABGLOM 59 06/28/2017    GLUCOSE 126 06/28/2017    PROT 7.2 06/28/2017    PROT 6.9 09/26/2012    CALCIUM 9.4 06/28/2017    BILITOT 0.2 06/28/2017    ALKPHOS 76 06/28/2017    AST 16 06/28/2017    ALT 15 06/28/2017       POC Tests: No results for input(s): POCGLU, POCNA, POCK, POCCL, POCBUN, POCHEMO, POCHCT in the last 72 hours. Coags:   Lab Results   Component Value Date    PROTIME 12.5 12/29/2011    INR 1.14 12/29/2011    APTT 27.4 12/29/2011       HCG (If Applicable): No results found for: PREGTESTUR, PREGSERUM, HCG, HCGQUANT     ABGs:   Lab Results   Component Value Date    PO2ART 78 06/11/2011    BEART 3.2 06/11/2011        Type & Screen (If Applicable):  No results found for: LABABO, LABRH    Drug/Infectious Status (If Applicable):  No results found for: HIV, HEPCAB    COVID-19 Screening (If Applicable):   Lab Results   Component Value Date    COVID19 NOT DETECTED 08/07/2020           Anesthesia Evaluation  Patient summary reviewed  Airway: Mallampati: Unable to assess / NA        Dental:    (+) edentulous      Pulmonary:   (+) pneumonia:            Patient did not smoke on day of surgery. Cardiovascular:  Exercise tolerance: poor (<4 METS),   (+) hypertension:,          Beta Blocker:  Not on Beta Blocker      ROS comment: Echo 2012:  IMPRESSIONS:    1.  Mild left ventricular hypertrophy noted. 2.  Left ventricular function preserved. Ejection fraction is around 50%  range. 3.  Mild left atrial enlargement present. 4.  Mild mitral and tricuspid regurgitation noted. 5.  Grade 2 diastolic dysfunction is noted. Neuro/Psych:   (+) seizures: poorly controlled, psychiatric history:depression/anxiety              ROS comment: Infantile cerebral palsy   Severe mental retardation    Paraplegia GI/Hepatic/Renal:   (+) hiatal hernia, GERD:, liver disease:, morbid obesity          Endo/Other:    (+) DiabetesType II DM, , .          Pt had no PAT visit       Abdominal:           Vascular: negative vascular ROS. Anesthesia Plan      general     ASA 4     (Covid -)  Induction: intravenous.     MIPS: Postoperative opioids intended and Prophylactic antiemetics administered. Anesthetic plan and risks discussed with legal guardian. Plan discussed with CRNA. NADEGE Guerrero - JASON   5/19/2021       Pre Anesthesia Assessment complete.  Chart reviewed on 5/19/2021

## 2021-05-20 ENCOUNTER — ANESTHESIA (OUTPATIENT)
Dept: OPERATING ROOM | Age: 56
End: 2021-05-20
Payer: MEDICARE

## 2021-05-20 ENCOUNTER — HOSPITAL ENCOUNTER (OUTPATIENT)
Age: 56
Setting detail: OUTPATIENT SURGERY
Discharge: HOME | End: 2021-05-20
Attending: SURGERY | Admitting: SURGERY
Payer: MEDICARE

## 2021-05-20 VITALS
HEART RATE: 79 BPM | WEIGHT: 177.5 LBS | SYSTOLIC BLOOD PRESSURE: 139 MMHG | DIASTOLIC BLOOD PRESSURE: 77 MMHG | HEIGHT: 58 IN | OXYGEN SATURATION: 99 % | RESPIRATION RATE: 16 BRPM | BODY MASS INDEX: 37.26 KG/M2 | TEMPERATURE: 98 F

## 2021-05-20 VITALS
SYSTOLIC BLOOD PRESSURE: 102 MMHG | DIASTOLIC BLOOD PRESSURE: 86 MMHG | TEMPERATURE: 97.7 F | RESPIRATION RATE: 13 BRPM | OXYGEN SATURATION: 96 %

## 2021-05-20 DIAGNOSIS — L91.0 KELOID: Primary | ICD-10-CM

## 2021-05-20 LAB
EKG ATRIAL RATE: 59 BPM
EKG DIAGNOSIS: NORMAL
EKG P AXIS: 9 DEGREES
EKG P-R INTERVAL: 168 MS
EKG Q-T INTERVAL: 428 MS
EKG QRS DURATION: 92 MS
EKG QTC CALCULATION (BAZETT): 423 MS
EKG R AXIS: 20 DEGREES
EKG T AXIS: 69 DEGREES
EKG VENTRICULAR RATE: 59 BPM
GLUCOSE BLD-MCNC: 126 MG/DL (ref 70–99)
HCT VFR BLD CALC: 39.8 % (ref 37–47)
HEMOGLOBIN: 13.1 GM/DL (ref 12.5–16)
MCH RBC QN AUTO: 28.6 PG (ref 27–31)
MCHC RBC AUTO-ENTMCNC: 32.9 % (ref 32–36)
MCV RBC AUTO: 86.9 FL (ref 78–100)
PDW BLD-RTO: 13 % (ref 11.7–14.9)
PLATELET # BLD: 192 K/CU MM (ref 140–440)
PMV BLD AUTO: 11.8 FL (ref 7.5–11.1)
RBC # BLD: 4.58 M/CU MM (ref 4.2–5.4)
REASON FOR REJECTION: NORMAL
REJECTED TEST: NORMAL
SARS-COV-2, NAAT: NOT DETECTED
SOURCE: NORMAL
WBC # BLD: 7.5 K/CU MM (ref 4–10.5)

## 2021-05-20 PROCEDURE — 2500000003 HC RX 250 WO HCPCS: Performed by: SURGERY

## 2021-05-20 PROCEDURE — 7100000000 HC PACU RECOVERY - FIRST 15 MIN: Performed by: SURGERY

## 2021-05-20 PROCEDURE — 7100000001 HC PACU RECOVERY - ADDTL 15 MIN: Performed by: SURGERY

## 2021-05-20 PROCEDURE — 3600000002 HC SURGERY LEVEL 2 BASE: Performed by: SURGERY

## 2021-05-20 PROCEDURE — 88305 TISSUE EXAM BY PATHOLOGIST: CPT | Performed by: PATHOLOGY

## 2021-05-20 PROCEDURE — 13101 CMPLX RPR TRUNK 2.6-7.5 CM: CPT | Performed by: SURGERY

## 2021-05-20 PROCEDURE — 6360000002 HC RX W HCPCS: Performed by: SURGERY

## 2021-05-20 PROCEDURE — 7100000010 HC PHASE II RECOVERY - FIRST 15 MIN: Performed by: SURGERY

## 2021-05-20 PROCEDURE — 11406 EXC TR-EXT B9+MARG >4.0 CM: CPT | Performed by: SURGERY

## 2021-05-20 PROCEDURE — 93005 ELECTROCARDIOGRAM TRACING: CPT | Performed by: ANESTHESIOLOGY

## 2021-05-20 PROCEDURE — 3600000012 HC SURGERY LEVEL 2 ADDTL 15MIN: Performed by: SURGERY

## 2021-05-20 PROCEDURE — 82962 GLUCOSE BLOOD TEST: CPT

## 2021-05-20 PROCEDURE — 6360000002 HC RX W HCPCS: Performed by: NURSE ANESTHETIST, CERTIFIED REGISTERED

## 2021-05-20 PROCEDURE — 3700000001 HC ADD 15 MINUTES (ANESTHESIA): Performed by: SURGERY

## 2021-05-20 PROCEDURE — 85027 COMPLETE CBC AUTOMATED: CPT

## 2021-05-20 PROCEDURE — 80048 BASIC METABOLIC PNL TOTAL CA: CPT

## 2021-05-20 PROCEDURE — 3700000000 HC ANESTHESIA ATTENDED CARE: Performed by: SURGERY

## 2021-05-20 PROCEDURE — 2709999900 HC NON-CHARGEABLE SUPPLY: Performed by: SURGERY

## 2021-05-20 PROCEDURE — 7100000011 HC PHASE II RECOVERY - ADDTL 15 MIN: Performed by: SURGERY

## 2021-05-20 PROCEDURE — 13102 CMPLX RPR TRUNK ADDL 5CM/<: CPT | Performed by: SURGERY

## 2021-05-20 PROCEDURE — 87635 SARS-COV-2 COVID-19 AMP PRB: CPT

## 2021-05-20 PROCEDURE — 2500000003 HC RX 250 WO HCPCS: Performed by: NURSE ANESTHETIST, CERTIFIED REGISTERED

## 2021-05-20 PROCEDURE — 2580000003 HC RX 258: Performed by: SURGERY

## 2021-05-20 PROCEDURE — 93010 ELECTROCARDIOGRAM REPORT: CPT | Performed by: INTERNAL MEDICINE

## 2021-05-20 RX ORDER — OXYCODONE HYDROCHLORIDE AND ACETAMINOPHEN 5; 325 MG/1; MG/1
1-2 TABLET ORAL EVERY 6 HOURS PRN
Qty: 35 TABLET | Refills: 0 | Status: SHIPPED | OUTPATIENT
Start: 2021-05-20 | End: 2021-05-27

## 2021-05-20 RX ORDER — ONDANSETRON 2 MG/ML
4 INJECTION INTRAMUSCULAR; INTRAVENOUS
Status: DISCONTINUED | OUTPATIENT
Start: 2021-05-20 | End: 2021-05-20 | Stop reason: HOSPADM

## 2021-05-20 RX ORDER — SODIUM CHLORIDE, SODIUM LACTATE, POTASSIUM CHLORIDE, CALCIUM CHLORIDE 600; 310; 30; 20 MG/100ML; MG/100ML; MG/100ML; MG/100ML
INJECTION, SOLUTION INTRAVENOUS CONTINUOUS
Status: DISCONTINUED | OUTPATIENT
Start: 2021-05-20 | End: 2021-05-20 | Stop reason: HOSPADM

## 2021-05-20 RX ORDER — PROPOFOL 10 MG/ML
INJECTION, EMULSION INTRAVENOUS PRN
Status: DISCONTINUED | OUTPATIENT
Start: 2021-05-20 | End: 2021-05-20 | Stop reason: SDUPTHER

## 2021-05-20 RX ORDER — METHYLPREDNISOLONE SODIUM SUCCINATE 125 MG/2ML
INJECTION, POWDER, LYOPHILIZED, FOR SOLUTION INTRAMUSCULAR; INTRAVENOUS
Status: COMPLETED | OUTPATIENT
Start: 2021-05-20 | End: 2021-05-20

## 2021-05-20 RX ORDER — FENTANYL CITRATE 50 UG/ML
INJECTION, SOLUTION INTRAMUSCULAR; INTRAVENOUS PRN
Status: DISCONTINUED | OUTPATIENT
Start: 2021-05-20 | End: 2021-05-20 | Stop reason: SDUPTHER

## 2021-05-20 RX ORDER — FENTANYL CITRATE 50 UG/ML
25 INJECTION, SOLUTION INTRAMUSCULAR; INTRAVENOUS EVERY 5 MIN PRN
Status: DISCONTINUED | OUTPATIENT
Start: 2021-05-20 | End: 2021-05-20 | Stop reason: HOSPADM

## 2021-05-20 RX ORDER — LABETALOL HYDROCHLORIDE 5 MG/ML
5 INJECTION, SOLUTION INTRAVENOUS EVERY 10 MIN PRN
Status: DISCONTINUED | OUTPATIENT
Start: 2021-05-20 | End: 2021-05-20 | Stop reason: HOSPADM

## 2021-05-20 RX ORDER — FENTANYL CITRATE 50 UG/ML
50 INJECTION, SOLUTION INTRAMUSCULAR; INTRAVENOUS EVERY 5 MIN PRN
Status: DISCONTINUED | OUTPATIENT
Start: 2021-05-20 | End: 2021-05-20 | Stop reason: HOSPADM

## 2021-05-20 RX ORDER — HYDRALAZINE HYDROCHLORIDE 20 MG/ML
5 INJECTION INTRAMUSCULAR; INTRAVENOUS EVERY 10 MIN PRN
Status: DISCONTINUED | OUTPATIENT
Start: 2021-05-20 | End: 2021-05-20 | Stop reason: HOSPADM

## 2021-05-20 RX ORDER — LIDOCAINE HYDROCHLORIDE 20 MG/ML
INJECTION, SOLUTION INTRAVENOUS PRN
Status: DISCONTINUED | OUTPATIENT
Start: 2021-05-20 | End: 2021-05-20 | Stop reason: SDUPTHER

## 2021-05-20 RX ORDER — AMOXICILLIN 250 MG
2 CAPSULE ORAL DAILY
Qty: 60 TABLET | Refills: 3 | Status: SHIPPED | OUTPATIENT
Start: 2021-05-20 | End: 2021-05-30

## 2021-05-20 RX ORDER — ROCURONIUM BROMIDE 10 MG/ML
INJECTION, SOLUTION INTRAVENOUS PRN
Status: DISCONTINUED | OUTPATIENT
Start: 2021-05-20 | End: 2021-05-20 | Stop reason: SDUPTHER

## 2021-05-20 RX ORDER — SUCCINYLCHOLINE/SOD CL,ISO/PF 100 MG/5ML
SYRINGE (ML) INTRAVENOUS PRN
Status: DISCONTINUED | OUTPATIENT
Start: 2021-05-20 | End: 2021-05-20 | Stop reason: SDUPTHER

## 2021-05-20 RX ADMIN — FENTANYL CITRATE 50 MCG: 50 INJECTION, SOLUTION INTRAMUSCULAR; INTRAVENOUS at 14:25

## 2021-05-20 RX ADMIN — SUGAMMADEX 100 MG: 100 INJECTION, SOLUTION INTRAVENOUS at 15:12

## 2021-05-20 RX ADMIN — SUGAMMADEX 100 MG: 100 INJECTION, SOLUTION INTRAVENOUS at 15:19

## 2021-05-20 RX ADMIN — ROCURONIUM BROMIDE 20 MG: 10 INJECTION INTRAVENOUS at 14:25

## 2021-05-20 RX ADMIN — LIDOCAINE HYDROCHLORIDE 50 MG: 20 INJECTION, SOLUTION INTRAVENOUS at 13:45

## 2021-05-20 RX ADMIN — FENTANYL CITRATE 50 MCG: 50 INJECTION, SOLUTION INTRAMUSCULAR; INTRAVENOUS at 13:44

## 2021-05-20 RX ADMIN — Medication 100 MG: at 13:44

## 2021-05-20 RX ADMIN — PROPOFOL 80 MG: 10 INJECTION, EMULSION INTRAVENOUS at 13:44

## 2021-05-20 RX ADMIN — CEFAZOLIN 2000 MG: 10 INJECTION, POWDER, FOR SOLUTION INTRAVENOUS at 13:54

## 2021-05-20 RX ADMIN — ROCURONIUM BROMIDE 30 MG: 10 INJECTION INTRAVENOUS at 14:08

## 2021-05-20 RX ADMIN — SODIUM CHLORIDE, POTASSIUM CHLORIDE, SODIUM LACTATE AND CALCIUM CHLORIDE: 600; 310; 30; 20 INJECTION, SOLUTION INTRAVENOUS at 10:56

## 2021-05-20 ASSESSMENT — PULMONARY FUNCTION TESTS
PIF_VALUE: 23
PIF_VALUE: 27
PIF_VALUE: 23
PIF_VALUE: 23
PIF_VALUE: 2
PIF_VALUE: 22
PIF_VALUE: 18
PIF_VALUE: 23
PIF_VALUE: 1
PIF_VALUE: 23
PIF_VALUE: 10
PIF_VALUE: 22
PIF_VALUE: 23
PIF_VALUE: 27
PIF_VALUE: 22
PIF_VALUE: 24
PIF_VALUE: 23
PIF_VALUE: 23
PIF_VALUE: 22
PIF_VALUE: 23
PIF_VALUE: 22
PIF_VALUE: 23
PIF_VALUE: 23
PIF_VALUE: 22
PIF_VALUE: 23
PIF_VALUE: 22
PIF_VALUE: 24
PIF_VALUE: 23
PIF_VALUE: 25
PIF_VALUE: 5
PIF_VALUE: 23
PIF_VALUE: 23
PIF_VALUE: 0
PIF_VALUE: 23
PIF_VALUE: 1
PIF_VALUE: 23
PIF_VALUE: 22
PIF_VALUE: 23
PIF_VALUE: 24
PIF_VALUE: 23
PIF_VALUE: 18
PIF_VALUE: 1
PIF_VALUE: 23
PIF_VALUE: 23
PIF_VALUE: 26
PIF_VALUE: 15
PIF_VALUE: 2
PIF_VALUE: 22
PIF_VALUE: 22
PIF_VALUE: 23
PIF_VALUE: 22
PIF_VALUE: 25
PIF_VALUE: 23
PIF_VALUE: 22
PIF_VALUE: 23

## 2021-05-20 ASSESSMENT — PAIN SCALES - WONG BAKER: WONGBAKER_NUMERICALRESPONSE: 0

## 2021-05-20 NOTE — ANESTHESIA POSTPROCEDURE EVALUATION
Department of Anesthesiology  Postprocedure Note    Patient: Magda Posada  MRN: [id number]  YOB: 1965  Date of evaluation: 5/20/2021  Time:  6:48 PM     Procedure Summary     Date: 05/20/21 Room / Location: [address] [place] [address]    Anesthesia Start: 2805 Anesthesia Stop: 6733    Procedure: EXCISION OF FOUR UPPER BACK KELOIDS (N/A Back) Diagnosis: (KELOID)    Surgeons: Julissa Diana MD Responsible Provider: Moe Austin MD    Anesthesia Type: general ASA Status: 4          Anesthesia Type: general    Sara Phase I: Sara Score: 10    Sara Phase II: Sara Score: 10    Last vitals: Reviewed and per EMR flowsheets.        Anesthesia Post Evaluation    Patient location during evaluation: PACU  Patient participation: complete - patient participated (MRDD-noncommunicative)  Level of consciousness: anxious, agitated and responsive to physical stimuli  Pain score: 5  Airway patency: patent  Nausea & Vomiting: no nausea and no vomiting  Complications: no  Cardiovascular status: hemodynamically stable  Respiratory status: acceptable  Hydration status: euvolemic

## 2021-05-20 NOTE — PROGRESS NOTES
1630 Pt returned to room from PACU, report obtained from [address] called caregiver Earnestine Arpanangel, notified pt is back from surgery  1645 pt incontinent of stool, pericare done pt changed into clothing  1710 pt placed on lift and placed into wheelchair  [phone] pt escorted to main entrance per wheelchair for discharge with [address] papers handed to Earnestine Michel,  report called to Mikey graves at [facility]

## 2021-05-20 NOTE — H&P
HISTORY AND PHYSICAL EXAM    Date of Admission:  5/20/2021    PCP:  Parveen Dillon MD    Chief Complaint / History of Present Illness:  Braulio Gusman is a 48 y.o. female presenting with pain in the upper back, and upper back and is chronic, worsening and dull compared to patient's normal condition. It is severe in intensity and bleeds with the brasiere. . for a duration of MANY years   and is continuous with modifying factors increased by or worse with wearing brasieres. Continue NPO/Bowel rest, IV Fluids, Pain control, Nausea control, IV Antibiotics. PMH:   has a past medical history of Acute gastritis without bleeding, Anemia, Anemia, Cardiomegaly, Cataracts, bilateral, Cerebral palsy ([address] Cerebral palsy ([address] Cerebral palsy ([address] Congenital hiatus hernia, Constipation, chronic, CP (cerebral palsy) ([address] Cyst of pancreas, Diabetes mellitus ([address] Essential (primary) hypertension, Fibrocystic breast disease, GERD (gastroesophageal reflux disease), GERD (gastroesophageal reflux disease), Hemiplegia ([address] Hemiplegia affecting left nondominant side ([address] Hiatal hernia, History of blood transfusion (2011), Hyperlipidemia, Hypocalcemia, Liver dysfunction, Major depressive disorder, single episode, unspecified, Mental retardation, severe (I.Q. 20-34), OA (osteoarthritis), Other constipation, Other epilepsy, not intractable, without status epilepticus ([address] Pancreatitis (2011), Paraplegia ([address] Paraplegia ([address] Paraplegia, unspecified ([address] Pneumonia (11/2014), Pressure ulcer, Seizure disorder ([address] Seizures ([address] Severe intellectual disabilities, and Unspecified cataract. PSH:   has a past surgical history that includes Hysterectomy; External ear surgery (8/2007); Endoscopy, colon, diagnostic; other surgical history (11 04 2015); Cholecystectomy; and Colonoscopy (01/20/2017). Allergies:     Allergies   Allergen Reactions    Risperidone And Related Other (See Comments)     unknown    ferrous sulfate 325 (65 FE) MG tablet Take 325 mg by mouth daily (with breakfast)    Yes Historical Provider, MD GUSTAFSON Complex-C-E-Zn (ADVANCED STRESS FORMULA/ZINC) TABS Take 1 tablet by mouth nightly    Historical Provider, MD   Zinc Oxide (DESITIN CREAMY EX) Apply topically daily as needed    Historical Provider, MD   Sodium Phosphates (FLEET) 7-19 GM/118ML Place 1 enema rectally once as needed    Historical Provider, MD   magnesium hydroxide (MILK OF MAGNESIA) 400 MG/5ML suspension Take 30 mLs by mouth daily as needed for Constipation 10/8/15   Brittnee Whitmore MD   bisacodyl (DULCOLAX) 10 MG suppository Place 10 mg rectally daily. Historical Provider, MD   nystatin (MYCOSTATIN) 263915 UNIT/GM powder Apply topically 4 times daily as needed     Historical Provider, MD   alendronate (FOSAMAX) 70 MG tablet Take 70 mg by mouth every 7 days. Historical Provider, MD   acetaminophen (TYLENOL) 325 MG tablet Take 650 mg by mouth every 4 hours as needed.     Historical Provider, MD        Hospital Meds:    Current Facility-Administered Medications   Medication Dose Route Frequency Provider Last Rate Last Admin    ceFAZolin (ANCEF) 2000 mg in dextrose 5 % 100 mL IVPB  2,000 mg Intravenous Once Alice Moss MD        chlorhexidine gluconate (ANTISEPTIC SKIN CLEANSER) 4 % solution   Topical Once Alice Moss MD        lactated ringers infusion   Intravenous Continuous Alice Moss  mL/hr at 05/20/21 1056 New Bag at 05/20/21 1056      lactated ringers 100 mL/hr at 05/20/21 1056       Social History / Family History:        Social History     Socioeconomic History    Marital status: Single     Spouse name: None    Number of children: None    Years of education: None    Highest education level: None   Occupational History    None   Tobacco Use    Smoking status: Never Smoker    Smokeless tobacco: Never Used    Tobacco comment: caregiver is unsure of surgical, family or social hx1/19/2017 for adenopathy. No history of DVT/PE. Does not bruise/bleed easily. Psychiatric/Behavioral: Negative for agitation. All others reviewed and negative. Physical Exam:  Vital Signs:   Vitals:    05/20/21 0642   BP: 131/80   Pulse: 65   Resp: 20   Temp: 96.6 °F (35.9 °C)   SpO2: 99%     Body mass index is 37.1 kg/m². General appearance: Pt Alert and oriented, to persons place and time, in no apparent acute distress. HEENT:  ALE, EOMI, Conjunctivae clear. No JVDs, Bruits, No thyroid-megaly. Lungs: No dyspnea. Clear to auscultation bilaterally. Heart: Regular rate and rhythm, S1, S2 normal, no murmur, rub or gallop. No legs edema. Abdomen: Non tender. Non distended. Positive bowel sounds. No hernias noted, no masses palpable. Extremities: Warm, well perfused, no cyanosis or edema. Skin: Skin color, texture, turgor normal. Multiple Keloids in the upper back and upper front chest, but the symptomatic ones are the back large three. Neurologic: Grossly normal, Cranial nerves from II to XII intact. Deep tendon reflexes normal.  Psychology: Mood stable. Lymph nodes: No palpable LN in the neck, abdomen, or groins. Radiologic / Imaging / TESTING  No results found.       Labs:    Recent Results (from the past 24 hour(s))   COVID-19, Rapid    Collection Time: 05/20/21  6:43 AM    Specimen: Nasopharyngeal   Result Value Ref Range    Source THROAT     SARS-CoV-2, NAAT NOT DETECTED NOT DETECTED   CBC    Collection Time: 05/20/21 10:15 AM   Result Value Ref Range    WBC 7.5 4.0 - 10.5 K/CU MM    RBC 4.58 4.2 - 5.4 M/CU MM    Hemoglobin 13.1 12.5 - 16.0 GM/DL    Hematocrit 39.8 37 - 47 %    MCV 86.9 78 - 100 FL    MCH 28.6 27 - 31 PG    MCHC 32.9 32.0 - 36.0 %    RDW 13.0 11.7 - 14.9 %    Platelets 919 808 - 853 K/CU MM    MPV 11.8 (H) 7.5 - 11.1 FL   SPECIMEN REJECTION    Collection Time: 05/20/21 10:15 AM   Result Value Ref Range    Rejected Test EBCG     Reason for Rejection UNABLE TO PERFORM TESTING: POCT Glucose    Collection Time: 05/20/21 10:34 AM   Result Value Ref Range    POC Glucose 126 (H) 70 - 99 MG/DL   EKG 12 Lead    Collection Time: 05/20/21 10:41 AM   Result Value Ref Range    Ventricular Rate 59 BPM    Atrial Rate 59 BPM    P-R Interval 168 ms    QRS Duration 92 ms    Q-T Interval 428 ms    QTc Calculation (Bazett) 423 ms    P Axis 9 degrees    R Axis 20 degrees    T Axis 69 degrees    Diagnosis       Sinus bradycardia  T wave abnormality, consider anterior ischemia  Abnormal ECG  No previous ECGs available           Diagnosis:  Patient Active Problem List   Diagnosis    Seizure disorder (HCC)    Infantile cerebral palsy (HCC)    Severe mental retardation    Encephalomalacia    Hypertension    Pressure ulcer, sacrum    Sepsis (HCC)    Type II or unspecified type diabetes mellitus without mention of complication, uncontrolled    Hypokalemia    Thrombocytopenia, secondary    Hypocalcemia    Bleeding from wound    Wound of sacral region    Decubitus ulcer of coccygeal region, stage 4 (HCC)    Decubitus ulcer    Open wound of lower back and pelvis w/o penentrat into retroperitoneum    Keloid           Assessment & Plan: Will excise 3 upper back keloids for symptom control of her bleeding against her braziers.     ____________________________________________    Shante Vyas MD, FACS, FICS    5/20/2021  1:09 PM

## 2021-05-20 NOTE — PROGRESS NOTES
1539 Patient arrived to PACU from OR. Monitors applied and alarms on. No drainage from sites. Report from [address].   1600 Patient turned side to side in bed.   1621 Patient transferred to same day surgery. Report to [facility] [place].

## 2021-05-20 NOTE — CONSULTS
Attempted Midline to LUE cephalic without success, vessel too tortuous, catheter kinked when attempting to advance through vessel. [place] [facility] [place] Nexiva 20g 1.75\" Extra Long PIV inserted in LUE cephailic vessel, labs drawn, IV flushes easy and continues to have blood return.

## 2021-06-08 ENCOUNTER — OFFICE VISIT (OUTPATIENT)
Dept: BARIATRICS/WEIGHT MGMT | Age: 56
End: 2021-06-08

## 2021-06-08 VITALS
WEIGHT: 177 LBS | DIASTOLIC BLOOD PRESSURE: 88 MMHG | HEART RATE: 68 BPM | TEMPERATURE: 98.1 F | OXYGEN SATURATION: 95 % | SYSTOLIC BLOOD PRESSURE: 128 MMHG | BODY MASS INDEX: 36.99 KG/M2

## 2021-06-08 DIAGNOSIS — L91.0 KELOID: Primary | ICD-10-CM

## 2021-06-08 PROCEDURE — 99024 POSTOP FOLLOW-UP VISIT: CPT | Performed by: SURGERY

## 2021-06-08 RX ORDER — LACOSAMIDE 50 MG/1
TABLET ORAL
COMMUNITY
End: 2021-06-08

## 2021-06-08 NOTE — PROGRESS NOTES
GENERAL SURGERY OFFICE NOTE    SUBJECTIVE:    Patient presenting today referred from Mckayla Javed MD and No ref. provider found, for   Chief Complaint   Patient presents with    Follow-up     PO Excision         HPI: Penny Jo is a 48 y.o. female post op:  1. Removal of 4 back keloids: 10 cm x 4 cm, 8 cm x 5 cm, 7 cm x 5 cm and 8 cm x 6 cm. ANESTHESIA: General endotracheal anesthesia, as well as local to the wounds  using 1% Xylocaine with epinephrine and 0.5% Marcaine in a 50:50 mixture  preoperatively and postoperatively. 2. Steroid injection of the wounds edges all 4 incisions bilaterally. Wounds very nicely healing, no erythema, no induration, no purulent discharge, but 3 opened a little, need washed with Betadine, and Bacitracin ointment BID, and then covered with bandaids. .  No constipation, BMs back to normal.    Thoroughly reviewed the patient's medical history, family history, social history and review of systems with the patient today in the office. Please see medical record for pertinent positives.       Past Medical History:   Diagnosis Date    Acute gastritis without bleeding     Anemia     Anemia     Cardiomegaly     Cataracts, bilateral     Cerebral palsy (HCC)     Cerebral palsy (HCC)     Cerebral palsy (HCC)     Congenital hiatus hernia     Constipation, chronic     CP (cerebral palsy) (HCC)     Cyst of pancreas     Diabetes mellitus ([facility]     Essential (primary) hypertension     Fibrocystic breast disease     GERD (gastroesophageal reflux disease)     pt is on pureed diet and thin liquids    GERD (gastroesophageal reflux disease)     Hemiplegia (HCC)     Hemiplegia affecting left nondominant side (HCC)     Hiatal hernia     History of blood transfusion 2011    Hyperlipidemia     Hypocalcemia     Liver dysfunction     Major depressive disorder, single episode, unspecified     Mental retardation, severe (I.Q. 20-34)     pt is verbal, non ambulatory, incontinent- pt feeds self but needs help with all other ADL's    OA (osteoarthritis)     Other constipation     Other epilepsy, not intractable, without status epilepticus ([address]     Pancreatitis 2011    Paraplegia ([address]     \"from waist down\"    Paraplegia ([address]     Paraplegia, unspecified ([address]     Pneumonia 11/2014    Pressure ulcer     sacral area    Seizure disorder ([address]     Seizures ([address]     per caregiver- no seizure [facility] 10/9/2015    Severe intellectual disabilities     Unspecified cataract         Past Surgical History:   Procedure Laterality Date    ABDOMEN SURGERY N/A 5/20/2021    EXCISION OF FOUR UPPER BACK KELOIDS performed by Sherry Bullard MD at [address]      per old chart hx of Lap choley by Dr Harris Sullivan  01/20/2017    ENDOSCOPY, COLON, DIAGNOSTIC      per old chart EGD 8/2013    EXTERNAL EAR SURGERY  8/2007    debridement of external auditory canals    HYSTERECTOMY      OTHER SURGICAL HISTORY  11 04 2015    closure of recurrent pilondial cyst        Social History     Socioeconomic History    Marital status: Single     Spouse name: Not on file    Number of children: Not on file    Years of education: Not on file    Highest education level: Not on file   Occupational History    Not on file   Tobacco Use    Smoking status: Never Smoker    Smokeless tobacco: Never Used    Tobacco comment: caregiver is unsure of surgical, family or social hx1/19/2017   Vaping Use    Vaping Use: Never used   Substance and Sexual Activity    Alcohol use: No    Drug use: No    Sexual activity: Never   Other Topics Concern    Not on file   Social History Narrative    Not on file     Social Determinants of Health     Financial Resource Strain:     Difficulty of Paying Living Expenses:    Food Insecurity:     Worried About Running Out of Food in the Last Year:     [address] in the Last Year:    Transportation Needs:     Lack of Transportation (Medical):  Lack of Transportation (Non-Medical):    Physical Activity:     Days of Exercise per Week:     Minutes of Exercise per Session:    Stress:     Feeling of Stress :    Social Connections:     Frequency of Communication with Friends and Family:     Frequency of Social Gatherings with Friends and Family:     Attends Islam Services:     Active Member of Clubs or Organizations:     Attends Club or Organization Meetings:     Marital Status:    Intimate Partner Violence:     Fear of Current or Ex-Partner:     Emotionally Abused:     Physically Abused:     Sexually Abused: Allergies   Allergen Reactions    Risperidone And Related Other (See Comments)     unknown    Tuberculin Tests Other (See Comments)        Family History   Family history unknown: Yes       Current Outpatient Medications   Medication Sig Dispense Refill    insulin glargine (BASAGLAR KWIKPEN) 100 UNIT/ML injection pen Inject 17 Units into the skin 2 times daily      B Complex-C-E-Zn (ADVANCED STRESS FORMULA/ZINC) TABS Take 1 tablet by mouth nightly      Icosapent Ethyl (VASCEPA) 1 g CAPS capsule Take 2 capsules by mouth 4 times daily      Cholecalciferol (VITAMIN D3) 50 MCG (2000 UT) CAPS Take 1,000 Units by mouth daily      Zinc Oxide (DESITIN CREAMY EX) Apply topically daily as needed      atorvastatin (LIPITOR) 10 MG tablet Take 10 mg by mouth daily      Sodium Phosphates (FLEET) 7-19 GM/118ML Place 1 enema rectally once as needed      insulin aspart (NOVOLOG) 100 UNIT/ML injection vial Inject 16 Units into the skin 4 times daily Use sliding scale      magnesium hydroxide (MILK OF MAGNESIA) 400 MG/5ML suspension Take 30 mLs by mouth daily as needed for Constipation      magnesium oxide (MAG-OX) 400 (241.3 MG) MG TABS tablet Take 1 tablet by mouth 2 times daily.  potassium chloride SA (K-DUR;KLOR-CON M) 20 MEQ tablet Take 1 tablet by mouth daily (with breakfast).  60 tablet 3    bisacodyl (DULCOLAX) 10 MG suppository Place 10 mg rectally daily.  folic acid (FOLVITE) 238 MCG tablet Take 800 mcg by mouth daily       nystatin (MYCOSTATIN) 328582 UNIT/GM powder Apply topically 4 times daily as needed       omeprazole (PRILOSEC) 20 MG capsule Take 20 mg by mouth daily.  citalopram (CELEXA) 20 MG tablet Take 20 mg by mouth daily.  furosemide (LASIX) 20 MG tablet Take 20 mg by mouth daily.  alendronate (FOSAMAX) 70 MG tablet Take 70 mg by mouth every 7 days.  levetiracetam (KEPPRA) 500 MG tablet Take 750 mg by mouth 2 times daily.  calcium-vitamin D (OSCAL-500) 500-200 MG-UNIT per tablet Take 1 tablet by mouth daily       lacosamide (VIMPAT) 50 MG TABS tablet Take 50 mg by mouth 2 times daily       acetaminophen (TYLENOL) 325 MG tablet Take 650 mg by mouth every 4 hours as needed.  clonidine (CATAPRES) 0.1 MG tablet Take 0.1 mg by mouth 2 times daily.  docusate sodium (COLACE) 100 MG capsule Take 200 mg by mouth every evening.  ferrous sulfate 325 (65 FE) MG tablet Take 325 mg by mouth daily (with breakfast)        No current facility-administered medications for this visit. Review of Systems      OBJECTIVE:    /88   Pulse 68   Temp 98.1 °F (36.7 °C)   Wt 177 lb (80.3 kg)   SpO2 95%   BMI 36.99 kg/m²    Body mass index is 36.99 kg/m². Physical Exam        ASSESSMENT & PLAN:  Final Pathologic Diagnosis:   Skin, back, excision:   -     Fragments of skin with keloid. Electronically Signed Out By Kindra Laws MD      Wounds very nicely healing, no erythema, no induration, no purulent discharge, but 3 opened a little, need washed with Betadine, and Bacitracin ointment BID, and then covered with bandaids. Patient counseled on the risks, benefits, and alternatives of treatment plan at length while in the office today. Patient states an understanding and willingness to proceed with the plan.       Follow Up:  Return in about 2 weeks (around 6/22/2021) for For wound recheck.       Radha Villalobos MD, FACS, FICS.    6/8/21

## 2021-12-22 NOTE — ANESTHESIA PRE PROCEDURE
Department of Anesthesiology  Preprocedure Note       Name:  Charlotte Alves   Age:  49 y.o.  :  1965                                          MRN:  [id number]         Date:  2020      Surgeon: Alicia Pepe):  Tiffany Barcenas MD    Procedure: Procedure(s):  EGD ESOPHAGOGASTRODUODENOSCOPY    Medications prior to admission:   Prior to Admission medications    Medication Sig Start Date End Date Taking? Authorizing Provider   insulin glargine (BASAGLAR KWIKPEN) 100 UNIT/ML injection pen Inject 17 Units into the skin 2 times daily    Historical Provider, MD GUSTAFSON Complex-C-E-Zn (ADVANCED STRESS FORMULA/ZINC) TABS Take 1 tablet by mouth nightly    Historical Provider, MD   Icosapent Ethyl (VASCEPA) 1 g CAPS capsule Take 2 capsules by mouth 4 times daily    Historical Provider, MD   Cholecalciferol (VITAMIN D3) 50 MCG (2000) CAPS Take 1,000 Units by mouth daily    Historical Provider, MD   Zinc Oxide (DESITIN CREAMY EX) Apply topically daily as needed    Historical Provider, MD   atorvastatin (LIPITOR) 10 MG tablet Take 10 mg by mouth daily    Historical Provider, MD   Sodium Phosphates (FLEET) 7-19 GM/118ML Place 1 enema rectally once as needed    Historical Provider, MD   insulin aspart (NOVOLOG) 100 UNIT/ML injection vial Inject 16 Units into the skin 4 times daily Use sliding scale    Historical Provider, MD   magnesium hydroxide (MILK OF MAGNESIA) 400 MG/5ML suspension Take 30 mLs by mouth daily as needed for Constipation 10/8/15   Candido Mcdonough MD   magnesium oxide (MAG-OX) 400 (241.3 MG) MG TABS tablet Take 1 tablet by mouth 2 times daily. 14   Candido Mcdonough MD   potassium chloride SA (K-DUR;KLOR-CON M) 20 MEQ tablet Take 1 tablet by mouth daily (with breakfast). 14   Candido Mcdonough MD   bisacodyl (DULCOLAX) 10 MG suppository Place 10 mg rectally daily.     Historical Provider, MD   folic acid (FOLVITE) 533 MCG tablet Take 800 mcg by mouth daily     Historical Provider, MD   nystatin (MYCOSTATIN) enema rectally once as needed      insulin aspart (NOVOLOG) 100 UNIT/ML injection vial Inject 16 Units into the skin 4 times daily Use sliding scale      magnesium hydroxide (MILK OF MAGNESIA) 400 MG/5ML suspension Take 30 mLs by mouth daily as needed for Constipation      magnesium oxide (MAG-OX) 400 (241.3 MG) MG TABS tablet Take 1 tablet by mouth 2 times daily.  potassium chloride SA (K-DUR;KLOR-CON M) 20 MEQ tablet Take 1 tablet by mouth daily (with breakfast). 60 tablet 3    bisacodyl (DULCOLAX) 10 MG suppository Place 10 mg rectally daily.  folic acid (FOLVITE) 090 MCG tablet Take 800 mcg by mouth daily       nystatin (MYCOSTATIN) 413947 UNIT/GM powder Apply topically 4 times daily as needed       omeprazole (PRILOSEC) 20 MG capsule Take 20 mg by mouth daily.  citalopram (CELEXA) 20 MG tablet Take 20 mg by mouth daily.  furosemide (LASIX) 20 MG tablet Take 20 mg by mouth daily.  alendronate (FOSAMAX) 70 MG tablet Take 70 mg by mouth every 7 days.  levetiracetam (KEPPRA) 500 MG tablet Take 750 mg by mouth 2 times daily.  calcium-vitamin D (OSCAL-500) 500-200 MG-UNIT per tablet Take 1 tablet by mouth daily       lacosamide (VIMPAT) 50 MG TABS tablet Take 50 mg by mouth 2 times daily       acetaminophen (TYLENOL) 325 MG tablet Take 650 mg by mouth every 4 hours as needed.  clonidine (CATAPRES) 0.1 MG tablet Take 0.1 mg by mouth 2 times daily.  docusate sodium (COLACE) 100 MG capsule Take 200 mg by mouth every evening.  ferrous sulfate 325 (65 FE) MG tablet Take 325 mg by mouth daily (with breakfast)          Allergies:     Allergies   Allergen Reactions    Risperidone And Related Other (See Comments)     unknown    Tuberculin Tests Other (See Comments)       Problem List:    Patient Active Problem List   Diagnosis Code    Seizure disorder ([facility] G40.909    Infantile cerebral palsy ([facility] [address] G80.9    Severe mental retardation F72    Encephalomalacia G93.89    Hypertension I10    Pressure ulcer, sacrum L89.159    Sepsis (HCC) A41.9    Type II or unspecified type diabetes mellitus without mention of complication, uncontrolled E11.65    Hypokalemia E87.6    Thrombocytopenia, secondary D69.59    Hypocalcemia E83.51    Bleeding from wound T14. 8XXA    Wound of sacral region S31.000A    Decubitus ulcer of coccygeal region, stage 4 (HCC) L89.154    Decubitus ulcer L89.90    Open wound of lower back and pelvis w/o penentrat into retroperitoneum S31.000A       Past Medical History:        Diagnosis Date    Acute gastritis without bleeding     Anemia     Anemia     Cardiomegaly     Cataracts, bilateral     Cerebral palsy (HCC)     Cerebral palsy (HCC)     Cerebral palsy (HCC)     Congenital hiatus hernia     Constipation, chronic     CP (cerebral palsy) (HCC)     Cyst of pancreas     Diabetes mellitus ([address]     Essential (primary) hypertension     Fibrocystic breast disease     GERD (gastroesophageal reflux disease)     pt is on pureed diet and thin liquids    GERD (gastroesophageal reflux disease)     Hemiplegia (HCC)     Hemiplegia affecting left nondominant side (HCC)     Hiatal hernia     History of blood transfusion 2011    Hyperlipidemia     Hypocalcemia     Liver dysfunction     Major depressive disorder, single episode, unspecified     Mental retardation, severe (I.Q. 20-34)     pt is verbal, non ambulatory, incontinent- pt feeds self but needs help with all other ADL's    OA (osteoarthritis)     Other constipation     Other epilepsy, not intractable, without status epilepticus ([address]     Pancreatitis 2011    Paraplegia ([address]     \"from waist down\"    Paraplegia ([address]     Paraplegia, unspecified ([address]     Pneumonia 11/2014    Pressure ulcer     sacral area    Seizure disorder ([address]     Seizures ([address]     per caregiver- no seizure since at [facility] 10/9/2015    Severe intellectual disabilities     Unspecified cataract input(s): POCGLU, POCNA, POCK, POCCL, POCBUN, POCHEMO, POCHCT in the last 72 hours. Coags:   Lab Results   Component Value Date    PROTIME 12.5 12/29/2011    INR 1.14 12/29/2011    APTT 27.4 12/29/2011       HCG (If Applicable): No results found for: PREGTESTUR, PREGSERUM, HCG, HCGQUANT     ABGs:   Lab Results   Component Value Date    PO2ART 78 06/11/2011    BEART 3.2 06/11/2011        Type & Screen (If Applicable):  No results found for: LABABO, LABRH    Drug/Infectious Status (If Applicable):  No results found for: HIV, HEPCAB    COVID-19 Screening (If Applicable):   Lab Results   Component Value Date    COVID19 NOT DETECTED 08/07/2020         Anesthesia Evaluation  Patient summary reviewed  Airway:         Dental:          Pulmonary:                              Cardiovascular:    (+) hypertension:,                ROS comment: 2011 echo   IMPRESSIONS:    1.  Mild left ventricular hypertrophy noted. 2.  Left ventricular function preserved. Ejection fraction is around 50%  range. 3.  Mild left atrial enlargement present. 4.  Mild mitral and tricuspid regurgitation noted. 5.  Grade 2 diastolic dysfunction is noted. Neuro/Psych:   (+) seizures:, psychiatric history:             ROS comment: Left hemiplegia. MRDD,   CP  GI/Hepatic/Renal:   (+) hiatal hernia, GERD:,           Endo/Other:    (+) DiabetesType II DM, , blood dyscrasia: anemia:., .                 Abdominal:           Vascular:                                        Anesthesia Plan      MAC     ASA 4     ( Pre Anesthesia Assessment complete.  Chart reviewed on 8/12/2020)                            NADEGE Ashraf - CRNA   8/12/2020 Yes
